# Patient Record
Sex: MALE | Race: WHITE | NOT HISPANIC OR LATINO | URBAN - METROPOLITAN AREA
[De-identification: names, ages, dates, MRNs, and addresses within clinical notes are randomized per-mention and may not be internally consistent; named-entity substitution may affect disease eponyms.]

---

## 2021-09-04 ENCOUNTER — EMERGENCY (EMERGENCY)
Facility: HOSPITAL | Age: 31
LOS: 1 days | Discharge: HOME | End: 2021-09-04
Admitting: EMERGENCY MEDICINE
Payer: COMMERCIAL

## 2021-09-04 VITALS
RESPIRATION RATE: 16 BRPM | TEMPERATURE: 98 F | SYSTOLIC BLOOD PRESSURE: 105 MMHG | DIASTOLIC BLOOD PRESSURE: 65 MMHG | HEART RATE: 63 BPM | OXYGEN SATURATION: 98 %

## 2021-09-04 VITALS
DIASTOLIC BLOOD PRESSURE: 74 MMHG | SYSTOLIC BLOOD PRESSURE: 111 MMHG | TEMPERATURE: 98 F | WEIGHT: 160.06 LBS | OXYGEN SATURATION: 97 % | HEART RATE: 68 BPM | HEIGHT: 70 IN | RESPIRATION RATE: 15 BRPM
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
## 2021-09-04 DIAGNOSIS — S01.111A LACERATION WITHOUT FOREIGN BODY OF RIGHT EYELID AND PERIOCULAR AREA, INITIAL ENCOUNTER: ICD-10-CM
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
## 2021-09-04 DIAGNOSIS — H02.401 UNSPECIFIED PTOSIS OF RIGHT EYELID: ICD-10-CM

## 2021-09-04 PROCEDURE — 70486 CT MAXILLOFACIAL W/O DYE: CPT | Mod: 26

## 2021-09-04 PROCEDURE — 99284 EMERGENCY DEPT VISIT MOD MDM: CPT

## 2021-09-04 PROCEDURE — 70450 CT HEAD/BRAIN W/O DYE: CPT | Mod: 26

## 2021-09-04 NOTE — ED ADULT TRIAGE NOTE - CHIEF COMPLAINT QUOTE
laceration to right eyebrow s/p being struck in the face (accidentally) while playing basketball, sent from urgent care for eval, -loc

## 2021-09-04 NOTE — ED PROVIDER NOTE - PATIENT PORTAL LINK FT
You can access the FollowMyHealth Patient Portal offered by [facility] by registering at the following website: [email or web address]. By joining [facility]’s FollowMyHealth portal, you will also be able to view your health information using other applications (apps) compatible with our system.

## 2021-09-04 NOTE — ED ADULT NURSE NOTE - OBJECTIVE STATEMENT
Pt came in c/o of laceration to right eyebrow after getting struck while playing basketball. PT arrives from . Tetanus utd. Bleeding controlled. Denies fever, chills, sob, cp, n/v/d, headache/dizziness. A&Ox3 speaking in full sentences Pt came in c/o of laceration to right eyebrow after getting struck while playing basketball. PT arrives from . Tetanus utd. Bleeding controlled. Denies fever, chills, sob, cp, n/v/d, headache/dizziness, loc. A&Ox3 speaking in full sentences

## 2021-09-04 NOTE — ED PROVIDER NOTE - OBJECTIVE STATEMENT
31 yo M w/ no pertinent PMHx presents to the ED with laceration to R eyebrow. Pt sustained laceration after he was elbowed to the face while playing basketball today. Pt was sent into the ED from urgent care for repair. Pt denies LOC, vision changes, N/V, headache. Tetanus is up to date.

## 2021-09-04 NOTE — ED PROVIDER NOTE - CLINICAL SUMMARY MEDICAL DECISION MAKING FREE TEXT BOX
Pt with 2 cm laceration to the R eyebrow, sustained while playing basketball today. No LOC, headache, vision changes, N/V. Dr Lr will repair laceration. Tetanus is up to date.

## 2022-06-27 NOTE — ED PROVIDER NOTE - IV ALTEPLASE DOOR HIDDEN
Medical Assistant Note:  Bonny Raymundo presents today for blood draw.    Patient seen by provider today: No.   present during visit today: Not Applicable.    Concerns: No Concerns.    Procedure:  Lab draw site: lac, Needle type: bf, Gauge: 23.    Post Assessment:  Labs drawn without difficulty: Yes.    Discharge Plan:  Departure Mode: Ambulatory.    Face to Face Time: 5 min.    Bianca King, CMA          
show